# Patient Record
Sex: FEMALE | Race: WHITE | Employment: FULL TIME | URBAN - METROPOLITAN AREA
[De-identification: names, ages, dates, MRNs, and addresses within clinical notes are randomized per-mention and may not be internally consistent; named-entity substitution may affect disease eponyms.]

---

## 2018-02-19 ENCOUNTER — OFFICE VISIT (OUTPATIENT)
Dept: INTERNAL MEDICINE CLINIC | Age: 28
End: 2018-02-19

## 2018-02-19 VITALS
DIASTOLIC BLOOD PRESSURE: 90 MMHG | BODY MASS INDEX: 55.32 KG/M2 | SYSTOLIC BLOOD PRESSURE: 126 MMHG | WEIGHT: 293 LBS | HEIGHT: 61 IN | HEART RATE: 92 BPM | RESPIRATION RATE: 20 BRPM

## 2018-02-19 DIAGNOSIS — Z11.4 SCREENING FOR HIV (HUMAN IMMUNODEFICIENCY VIRUS): ICD-10-CM

## 2018-02-19 DIAGNOSIS — L70.0 ACNE VULGARIS: ICD-10-CM

## 2018-02-19 DIAGNOSIS — L68.0 HIRSUTISM: ICD-10-CM

## 2018-02-19 DIAGNOSIS — E66.01 MORBID OBESITY WITH BMI OF 60.0-69.9, ADULT (HCC): ICD-10-CM

## 2018-02-19 DIAGNOSIS — Z00.00 ANNUAL PHYSICAL EXAM: Primary | ICD-10-CM

## 2018-02-19 DIAGNOSIS — E88.81 DYSMETABOLIC SYNDROME: ICD-10-CM

## 2018-02-19 PROCEDURE — 99385 PREV VISIT NEW AGE 18-39: CPT | Performed by: INTERNAL MEDICINE

## 2018-02-19 PROCEDURE — 99203 OFFICE O/P NEW LOW 30 MIN: CPT | Performed by: INTERNAL MEDICINE

## 2018-02-19 NOTE — PROGRESS NOTES
[facility]  History and Physical  Yoav Narvaez M.D. King Harrington  YOB: 1990    Date of Service:  2/19/2018    Chief Complaint:   King Harrington is a 24 y.o. female who presents for   Chief Complaint   Patient presents with   Jerry Spears New Doctor       HPI: Here for Annual Physical and Follow up. She was told she may have POS at age 26 but never had work up for it. She has irregular periods, acne, facial hair and difficulty losing weight. She's wanting to start a family soon and would like to lose some weight. Wt Readings from Last 3 Encounters:   02/19/18 (!) 327 lb (148.3 kg)     BP Readings from Last 3 Encounters:   02/19/18 (!) 126/90       There is no problem list on file for this patient. No Known Allergies  No outpatient prescriptions have been marked as taking for the 2/19/18 encounter (Office Visit) with Jessi Lima MD.       History reviewed. No pertinent past medical history. History reviewed. No pertinent surgical history.   Family History   Problem Relation Age of Onset    Asthma Mother     Diabetes Mother     High Blood Pressure Mother     High Cholesterol Mother     Anxiety Disorder Father     Heart Attack Father 35    High Blood Pressure Father     High Cholesterol Father     Kidney Disease Father     Thyroid Disease Father     Asthma Sister     Asthma Brother     High Blood Pressure Maternal Aunt     Anxiety Disorder Maternal Aunt     Diabetes Paternal Aunt     High Blood Pressure Paternal Aunt     High Cholesterol Paternal Aunt     Stroke Paternal Aunt     Thyroid Disease Paternal Aunt     Diabetes Maternal Grandmother     Heart Attack Maternal Grandmother 61    High Blood Pressure Maternal Grandmother     High Cholesterol Maternal Grandmother     Kidney Disease Maternal Grandmother     Stroke Maternal Grandmother     Thyroid Disease Maternal Grandmother     Breast Cancer Maternal Grandmother     Heart Disease

## 2018-02-20 ENCOUNTER — TELEPHONE (OUTPATIENT)
Dept: INTERNAL MEDICINE CLINIC | Age: 28
End: 2018-02-20

## 2018-02-20 ENCOUNTER — HOSPITAL ENCOUNTER (OUTPATIENT)
Dept: GENERAL RADIOLOGY | Age: 28
Discharge: HOME | End: 2018-02-20
Attending: INTERNAL MEDICINE | Admitting: INTERNAL MEDICINE

## 2018-02-20 DIAGNOSIS — E66.01 MORBID OBESITY WITH BMI OF 60.0-69.9, ADULT (HCC): ICD-10-CM

## 2018-02-20 DIAGNOSIS — L70.0 ACNE VULGARIS: ICD-10-CM

## 2018-02-20 DIAGNOSIS — Z00.00 ANNUAL PHYSICAL EXAM: ICD-10-CM

## 2018-02-20 DIAGNOSIS — Z11.4 SCREENING FOR HIV (HUMAN IMMUNODEFICIENCY VIRUS): ICD-10-CM

## 2018-02-20 DIAGNOSIS — L68.0 HIRSUTISM: ICD-10-CM

## 2018-02-20 LAB
A/G RATIO: 1.3 (ref 1.1–2.2)
ALBUMIN SERPL-MCNC: 4.4 G/DL (ref 3.4–5)
ALP BLD-CCNC: 86 U/L (ref 40–129)
ALT SERPL-CCNC: 29 U/L (ref 10–40)
ANION GAP SERPL CALCULATED.3IONS-SCNC: 16 MMOL/L (ref 3–16)
AST SERPL-CCNC: 22 U/L (ref 15–37)
BASOPHILS ABSOLUTE: 0 K/UL (ref 0–0.2)
BASOPHILS RELATIVE PERCENT: 0.4 %
BILIRUB SERPL-MCNC: 0.4 MG/DL (ref 0–1)
BUN BLDV-MCNC: 14 MG/DL (ref 7–20)
CALCIUM SERPL-MCNC: 9.7 MG/DL (ref 8.3–10.6)
CHLORIDE BLD-SCNC: 100 MMOL/L (ref 99–110)
CHOLESTEROL, TOTAL: 161 MG/DL (ref 0–199)
CO2: 25 MMOL/L (ref 21–32)
CORTISOL - AM: 10.9 UG/DL (ref 4.3–22.4)
CREAT SERPL-MCNC: 0.7 MG/DL (ref 0.6–1.1)
EOSINOPHILS ABSOLUTE: 0.1 K/UL (ref 0–0.6)
EOSINOPHILS RELATIVE PERCENT: 1.1 %
FOLLICLE STIMULATING HORMONE: 6.4 MIU/ML
GFR AFRICAN AMERICAN: >60
GFR NON-AFRICAN AMERICAN: >60
GLOBULIN: 3.3 G/DL
GLUCOSE BLD-MCNC: 121 MG/DL (ref 70–99)
HCT VFR BLD CALC: 39 % (ref 36–48)
HDLC SERPL-MCNC: 58 MG/DL (ref 40–60)
HEMOGLOBIN: 12.8 G/DL (ref 12–16)
LDL CHOLESTEROL CALCULATED: 75 MG/DL
LUTEINIZING HORMONE: 10.2 MIU/ML
LYMPHOCYTES ABSOLUTE: 2.2 K/UL (ref 1–5.1)
LYMPHOCYTES RELATIVE PERCENT: 26.5 %
MCH RBC QN AUTO: 26.9 PG (ref 26–34)
MCHC RBC AUTO-ENTMCNC: 32.8 G/DL (ref 31–36)
MCV RBC AUTO: 81.9 FL (ref 80–100)
MONOCYTES ABSOLUTE: 0.5 K/UL (ref 0–1.3)
MONOCYTES RELATIVE PERCENT: 5.7 %
NEUTROPHILS ABSOLUTE: 5.5 K/UL (ref 1.7–7.7)
NEUTROPHILS RELATIVE PERCENT: 66.3 %
PDW BLD-RTO: 15 % (ref 12.4–15.4)
PLATELET # BLD: 362 K/UL (ref 135–450)
PMV BLD AUTO: 8.4 FL (ref 5–10.5)
POTASSIUM SERPL-SCNC: 4.4 MMOL/L (ref 3.5–5.1)
RBC # BLD: 4.76 M/UL (ref 4–5.2)
SODIUM BLD-SCNC: 141 MMOL/L (ref 136–145)
T4 FREE: 1.3 NG/DL (ref 0.9–1.8)
TOTAL PROTEIN: 7.7 G/DL (ref 6.4–8.2)
TRIGL SERPL-MCNC: 142 MG/DL (ref 0–150)
TSH SERPL DL<=0.05 MIU/L-ACNC: 7.67 UIU/ML (ref 0.27–4.2)
VLDLC SERPL CALC-MCNC: 28 MG/DL
WBC # BLD: 8.3 K/UL (ref 4–11)

## 2018-02-21 DIAGNOSIS — E03.9 BORDERLINE HYPOTHYROIDISM: ICD-10-CM

## 2018-02-21 DIAGNOSIS — E28.2 PCOS (POLYCYSTIC OVARIAN SYNDROME): Primary | ICD-10-CM

## 2018-02-21 DIAGNOSIS — Z00.00 ANNUAL PHYSICAL EXAM: ICD-10-CM

## 2018-02-21 DIAGNOSIS — R73.9 HYPERGLYCEMIA: Primary | ICD-10-CM

## 2018-02-21 LAB
ESTIMATED AVERAGE GLUCOSE: 131.2 MG/DL
HBA1C MFR BLD: 6.2 %
HIV AG/AB: NORMAL
HIV ANTIGEN: NORMAL
HIV-1 ANTIBODY: NORMAL
HIV-2 AB: NORMAL

## 2018-02-21 RX ORDER — LEVOTHYROXINE SODIUM 0.03 MG/1
25 TABLET ORAL DAILY
Qty: 30 TABLET | Refills: 0 | Status: SHIPPED | OUTPATIENT
Start: 2018-02-21 | End: 2018-03-13

## 2018-02-22 LAB
DHEAS (DHEA SULFATE): 304 UG/DL (ref 65–380)
TESTOSTERONE TOTAL: 65 NG/DL (ref 20–70)

## 2018-03-07 ENCOUNTER — OFFICE VISIT (OUTPATIENT)
Dept: OBGYN CLINIC | Age: 28
End: 2018-03-07

## 2018-03-07 VITALS
SYSTOLIC BLOOD PRESSURE: 102 MMHG | WEIGHT: 293 LBS | TEMPERATURE: 97 F | BODY MASS INDEX: 57.52 KG/M2 | HEIGHT: 60 IN | HEART RATE: 87 BPM | DIASTOLIC BLOOD PRESSURE: 70 MMHG

## 2018-03-07 DIAGNOSIS — E03.9 BORDERLINE HYPOTHYROIDISM: ICD-10-CM

## 2018-03-07 DIAGNOSIS — N92.6 IRREGULAR MENSES: ICD-10-CM

## 2018-03-07 DIAGNOSIS — Z01.419 WOMEN'S ANNUAL ROUTINE GYNECOLOGICAL EXAMINATION: Primary | ICD-10-CM

## 2018-03-07 DIAGNOSIS — E28.2 PCOS (POLYCYSTIC OVARIAN SYNDROME): ICD-10-CM

## 2018-03-07 DIAGNOSIS — N97.9 INFERTILITY, FEMALE: ICD-10-CM

## 2018-03-07 DIAGNOSIS — E88.81 INSULIN RESISTANCE: ICD-10-CM

## 2018-03-07 DIAGNOSIS — E66.01 MORBID OBESITY WITH BMI OF 60.0-69.9, ADULT (HCC): ICD-10-CM

## 2018-03-07 DIAGNOSIS — Z12.4 PAP SMEAR FOR CERVICAL CANCER SCREENING: ICD-10-CM

## 2018-03-07 DIAGNOSIS — E88.81 DYSMETABOLIC SYNDROME: ICD-10-CM

## 2018-03-07 PROCEDURE — 99385 PREV VISIT NEW AGE 18-39: CPT | Performed by: OBSTETRICS & GYNECOLOGY

## 2018-03-07 RX ORDER — PNV NO.95/FERROUS FUM/FOLIC AC 28MG-0.8MG
1 TABLET ORAL DAILY
Qty: 90 TABLET | Refills: 3 | Status: SHIPPED | OUTPATIENT
Start: 2018-03-07

## 2018-03-07 RX ORDER — CLINDAMYCIN HYDROCHLORIDE 150 MG/1
300 CAPSULE ORAL 2 TIMES DAILY
Qty: 14 CAPSULE | Refills: 0 | Status: SHIPPED | OUTPATIENT
Start: 2018-03-07 | End: 2018-03-14

## 2018-03-11 PROBLEM — N92.6 IRREGULAR MENSES: Status: ACTIVE | Noted: 2018-03-11

## 2018-03-11 PROBLEM — E28.2 PCOS (POLYCYSTIC OVARIAN SYNDROME): Status: ACTIVE | Noted: 2018-03-11

## 2018-03-11 PROBLEM — E88.81 INSULIN RESISTANCE: Status: ACTIVE | Noted: 2018-03-11

## 2018-03-11 PROBLEM — N97.9 INFERTILITY, FEMALE: Status: ACTIVE | Noted: 2018-03-11

## 2018-03-11 ASSESSMENT — ENCOUNTER SYMPTOMS
ABDOMINAL DISTENTION: 0
SHORTNESS OF BREATH: 0
NAUSEA: 0
DIARRHEA: 0
ABDOMINAL PAIN: 0
CONSTIPATION: 0
VOMITING: 0

## 2018-03-13 ENCOUNTER — OFFICE VISIT (OUTPATIENT)
Dept: INTERNAL MEDICINE CLINIC | Age: 28
End: 2018-03-13

## 2018-03-13 VITALS
RESPIRATION RATE: 18 BRPM | HEIGHT: 60 IN | BODY MASS INDEX: 57.52 KG/M2 | OXYGEN SATURATION: 93 % | WEIGHT: 293 LBS | DIASTOLIC BLOOD PRESSURE: 82 MMHG | SYSTOLIC BLOOD PRESSURE: 128 MMHG | HEART RATE: 104 BPM

## 2018-03-13 DIAGNOSIS — E03.9 BORDERLINE HYPOTHYROIDISM: ICD-10-CM

## 2018-03-13 DIAGNOSIS — R73.03 BORDERLINE DIABETES: Primary | ICD-10-CM

## 2018-03-13 DIAGNOSIS — E66.01 MORBID OBESITY WITH BMI OF 60.0-69.9, ADULT (HCC): ICD-10-CM

## 2018-03-13 PROCEDURE — 99214 OFFICE O/P EST MOD 30 MIN: CPT | Performed by: INTERNAL MEDICINE

## 2018-03-13 RX ORDER — LEVOTHYROXINE SODIUM 0.05 MG/1
50 TABLET ORAL DAILY
Qty: 30 TABLET | Refills: 1 | Status: SHIPPED | OUTPATIENT
Start: 2018-03-13 | End: 2018-08-08 | Stop reason: SDUPTHER

## 2018-03-13 NOTE — PROGRESS NOTES
King Harrington  YOB: 1990    Date of Service:  3/13/2018    Chief Complaint:      Chief Complaint   Patient presents with    Medication Check       HPI:  King Harrington is a 24 y.o. Borderline diabetes:  Stable on metformin 500 mg bid with some intermittent diarrhea. Hypothyroidism: Recent symptoms: fatigue, weight gain and hair loss. No problems with synthroid 25 mcg qd. She denies weight loss, cold intolerance, heat intolerance, constipation, diarrhea, edema, anxiety, tremor, palpitations and dysphagia. Patient is  taking her medication consistently on an empty stomach. ? PCOS w/u per Dr. Lloyd Molina. She has irregular periods, acne, facial hair and difficulty losing weight. She's wanting to start a family soon and would like to lose some weight. Exercising 4-5 times a week.     Lab Results   Component Value Date    LABA1C 6.2 02/20/2018     Lab Results   Component Value Date     02/20/2018    K 4.4 02/20/2018     02/20/2018    CO2 25 02/20/2018    BUN 14 02/20/2018    CREATININE 0.7 02/20/2018    GLUCOSE 121 (H) 02/20/2018    CALCIUM 9.7 02/20/2018     Lab Results   Component Value Date    CHOL 161 02/20/2018    TRIG 142 02/20/2018    HDL 58 02/20/2018    LDLCALC 75 02/20/2018     Lab Results   Component Value Date    ALT 29 02/20/2018    AST 22 02/20/2018     Lab Results   Component Value Date    TSH 7.67 (H) 02/20/2018    T4FREE 1.3 02/20/2018     Lab Results   Component Value Date    WBC 8.3 02/20/2018    HGB 12.8 02/20/2018    HCT 39.0 02/20/2018    MCV 81.9 02/20/2018     02/20/2018     No results found for: INR   No results found for: PSA   No results found for: [address]     Patient Active Problem List   Diagnosis    Morbid obesity with BMI of 60.0-69.9, adult ([address]    Dysmetabolic syndrome    Borderline hypothyroidism    PCOS (polycystic ovarian syndrome)    Irregular menses    Infertility, female    Insulin resistance       No Known Allergies  Outpatient

## 2018-05-10 ENCOUNTER — TELEPHONE (OUTPATIENT)
Dept: OBGYN CLINIC | Age: 28
End: 2018-05-10

## 2018-05-10 NOTE — TELEPHONE ENCOUNTER
Left patient message to recall office. Patient was scheduled for follow up appointment on 4/30/18 regarding left side pain/ PCOS. Patient cancelled due to loss of insurance. Calling to see if she would like to reschedule.

## 2018-08-08 ENCOUNTER — TELEPHONE (OUTPATIENT)
Dept: OBGYN CLINIC | Age: 28
End: 2018-08-08

## 2018-08-08 ENCOUNTER — OFFICE VISIT (OUTPATIENT)
Dept: FAMILY MEDICINE CLINIC | Age: 28
End: 2018-08-08

## 2018-08-08 VITALS
WEIGHT: 293 LBS | HEIGHT: 60 IN | DIASTOLIC BLOOD PRESSURE: 80 MMHG | HEART RATE: 64 BPM | BODY MASS INDEX: 57.52 KG/M2 | SYSTOLIC BLOOD PRESSURE: 148 MMHG | OXYGEN SATURATION: 98 %

## 2018-08-08 DIAGNOSIS — E66.01 MORBID OBESITY WITH BMI OF 60.0-69.9, ADULT (HCC): ICD-10-CM

## 2018-08-08 DIAGNOSIS — N92.6 IRREGULAR MENSES: ICD-10-CM

## 2018-08-08 DIAGNOSIS — R73.03 PREDIABETES: ICD-10-CM

## 2018-08-08 DIAGNOSIS — E03.4 HYPOTHYROIDISM DUE TO ACQUIRED ATROPHY OF THYROID: Primary | ICD-10-CM

## 2018-08-08 PROBLEM — E03.9 BORDERLINE HYPOTHYROIDISM: Status: RESOLVED | Noted: 2018-02-21 | Resolved: 2018-08-08

## 2018-08-08 PROCEDURE — 99214 OFFICE O/P EST MOD 30 MIN: CPT | Performed by: FAMILY MEDICINE

## 2018-08-08 RX ORDER — METFORMIN HYDROCHLORIDE 500 MG/1
500 TABLET, EXTENDED RELEASE ORAL
Qty: 60 TABLET | Refills: 3 | Status: SHIPPED | OUTPATIENT
Start: 2018-08-08 | End: 2018-08-10 | Stop reason: SDUPTHER

## 2018-08-08 RX ORDER — LEVOTHYROXINE SODIUM 0.05 MG/1
50 TABLET ORAL DAILY
Qty: 30 TABLET | Refills: 1 | Status: SHIPPED | OUTPATIENT
Start: 2018-08-08 | End: 2018-08-24 | Stop reason: SDUPTHER

## 2018-08-08 ASSESSMENT — PATIENT HEALTH QUESTIONNAIRE - PHQ9
SUM OF ALL RESPONSES TO PHQ QUESTIONS 1-9: 0
SUM OF ALL RESPONSES TO PHQ9 QUESTIONS 1 & 2: 0
1. LITTLE INTEREST OR PLEASURE IN DOING THINGS: 0
2. FEELING DOWN, DEPRESSED OR HOPELESS: 0
SUM OF ALL RESPONSES TO PHQ QUESTIONS 1-9: 0

## 2018-08-08 ASSESSMENT — ENCOUNTER SYMPTOMS
VOMITING: 0
SHORTNESS OF BREATH: 0
ABDOMINAL PAIN: 0
DIARRHEA: 0
EYE DISCHARGE: 0
NAUSEA: 0
SINUS PRESSURE: 0
EYE PAIN: 0
CONSTIPATION: 0
COUGH: 0
RHINORRHEA: 0
BLOOD IN STOOL: 0
EYE REDNESS: 0
CHEST TIGHTNESS: 0
COLOR CHANGE: 0
WHEEZING: 0

## 2018-08-08 NOTE — PATIENT INSTRUCTIONS

## 2018-08-08 NOTE — PROGRESS NOTES
Pressure Mother     High Cholesterol Mother     Anxiety Disorder Father     Heart Attack Father 35    High Blood Pressure Father     High Cholesterol Father     Kidney Disease Father     Thyroid Disease Father     Asthma Sister     Asthma Brother     High Blood Pressure Maternal Aunt     Anxiety Disorder Maternal Aunt     Diabetes Paternal Aunt     High Blood Pressure Paternal Aunt     High Cholesterol Paternal Aunt     Stroke Paternal Aunt     Thyroid Disease Paternal Aunt     Diabetes Maternal Grandmother     Heart Attack Maternal Grandmother 61    High Blood Pressure Maternal Grandmother     High Cholesterol Maternal Grandmother     Kidney Disease Maternal Grandmother     Stroke Maternal Grandmother     Thyroid Disease Maternal Grandmother     Breast Cancer Maternal Grandmother     Heart Disease Maternal Grandmother         2 MI    Other Maternal Grandmother         MS    Dementia Maternal Grandmother     Anxiety Disorder Paternal Grandmother     Depression Paternal Grandmother     Breast Cancer Paternal Grandmother     Early Death Paternal Grandfather 52        MI     Heart Attack Paternal Grandfather 52     Social History   Substance Use Topics    Smoking status: Never Smoker    Smokeless tobacco: Never Used    Alcohol use No     Lab Review   No visits with results within 2 Month(s) from this visit.    Latest known visit with results is:   Hospital Outpatient Visit on 02/20/2018   Component Date Value    TSH 02/20/2018 7.67*    T4 Free 02/20/2018 1.3     Cholesterol, Total 02/20/2018 161     Triglycerides 02/20/2018 142     HDL 02/20/2018 58     LDL Calculated 02/20/2018 75     VLDL Cholesterol Calcula* 02/20/2018 28     Sodium 02/20/2018 141     Potassium 02/20/2018 4.4     Chloride 02/20/2018 100     CO2 02/20/2018 25     Anion Gap 02/20/2018 16     Glucose 02/20/2018 121*    BUN 02/20/2018 14     CREATININE 02/20/2018 0.7     GFR Non- 02/20/2018 >60     GFR  02/20/2018 >60     Calcium 02/20/2018 9.7     Total Protein 02/20/2018 7.7     Alb 02/20/2018 4.4     Albumin/Globulin Ratio 02/20/2018 1.3     Total Bilirubin 02/20/2018 0.4     Alkaline Phosphatase 02/20/2018 86     ALT 02/20/2018 29     AST 02/20/2018 22     Globulin 02/20/2018 3.3     WBC 02/20/2018 8.3     RBC 02/20/2018 4.76     Hemoglobin 02/20/2018 12.8     Hematocrit 02/20/2018 39.0     MCV 02/20/2018 81.9     MCH 02/20/2018 26.9     MCHC 02/20/2018 32.8     RDW 02/20/2018 15.0     Platelets 50/63/0940 362     MPV 02/20/2018 8.4     Neutrophils % 02/20/2018 66.3     Lymphocytes % 02/20/2018 26.5     Monocytes % 02/20/2018 5.7     Eosinophils % 02/20/2018 1.1     Basophils % 02/20/2018 0.4     Neutrophils # 02/20/2018 5.5     Lymphocytes # 02/20/2018 2.2     Monocytes # 02/20/2018 0.5     Eosinophils # 02/20/2018 0.1     Basophils # 02/20/2018 0.0     LH 02/20/2018 10.2     FSH 02/20/2018 6.4     Testosterone 02/20/2018 65     Cortisol - AM 02/20/2018 10.9     DHEAS (DHEA Sulfate) 02/20/2018 304.0     HIV Ag/Ab 02/20/2018 Non-Reactive     HIV-1 Antibody 02/20/2018 Non-Reactive     HIV ANTIGEN 02/20/2018 Non-Reactive     HIV-2 Ab 02/20/2018 Non-Reactive     Hemoglobin A1C 02/20/2018 6.2     eAG 02/20/2018 131.2        Review of Systems   Constitutional: Negative for chills, fatigue, fever and unexpected weight change. HENT: Negative for ear discharge, ear pain, hearing loss, rhinorrhea, sinus pressure and tinnitus. Eyes: Negative for pain, discharge, redness and visual disturbance. Respiratory: Negative for cough, chest tightness, shortness of breath and wheezing. Cardiovascular: Negative for chest pain and palpitations. Gastrointestinal: Negative for abdominal pain, blood in stool, constipation, diarrhea, nausea and vomiting. Genitourinary: Negative for difficulty urinating, dysuria and hematuria.

## 2018-08-10 ENCOUNTER — TELEPHONE (OUTPATIENT)
Dept: PRIMARY CARE CLINIC | Age: 28
End: 2018-08-10

## 2018-08-10 RX ORDER — METFORMIN HYDROCHLORIDE 500 MG/1
500 TABLET, EXTENDED RELEASE ORAL
Qty: 30 TABLET | Refills: 3 | Status: SHIPPED | OUTPATIENT
Start: 2018-08-10

## 2018-08-10 RX ORDER — METFORMIN HYDROCHLORIDE 500 MG/1
500 TABLET, EXTENDED RELEASE ORAL
Qty: 30 TABLET | Refills: 3 | Status: CANCELLED | OUTPATIENT
Start: 2018-08-10

## 2018-08-10 NOTE — TELEPHONE ENCOUNTER
Patient's insurance is requiring she use [facility] and no longer [facility].   Please send a new script for metformin to [facility].

## 2018-08-10 NOTE — TELEPHONE ENCOUNTER
[facility] MetFORMIN HCl ER 500MG OR TB24. Key: [id number]. Message came back that the drug is too soon to refill. Please advise patient.

## 2018-08-14 ENCOUNTER — TELEPHONE (OUTPATIENT)
Dept: FAMILY MEDICINE CLINIC | Age: 28
End: 2018-08-14

## 2018-08-14 NOTE — TELEPHONE ENCOUNTER
Patient returned office call, discussed with patient MD response. PCOS is diagnosed by a multitude of tests, there is no one diagnostic measure that gives this results. Insulin levels, hormone levels, abnormal bleeding. Patient already taking metformin. Other options for treatment would be to add an OCP. Patient also understand that we support her seeing weight management, this is the best treatment for PCOS. Patient states that she reached out because PCP had noted that there was no clinical note or diagnosis is chart and for insurance to help cover weight loss management this would be needed. Patient explained to pcp that when she was seen by GYN, it was for pap. Patient is asking if a visit for diagnosis is needed or any additional testing. Or can a clinical note be added. Routing to MD to review.

## 2018-08-25 RX ORDER — LEVOTHYROXINE SODIUM 0.05 MG/1
50 TABLET ORAL DAILY
Qty: 30 TABLET | Refills: 3 | Status: SHIPPED | OUTPATIENT
Start: 2018-08-25 | End: 2018-09-11 | Stop reason: SDUPTHER

## 2018-09-11 NOTE — TELEPHONE ENCOUNTER
[facility] pharmacy called stating that they received a prescription for a 30 day supply of the Levothyroxine but the insurance will not cover the cost of the medication unless it is a three month supply. Please advise.

## 2018-09-12 RX ORDER — LEVOTHYROXINE SODIUM 0.05 MG/1
50 TABLET ORAL DAILY
Qty: 90 TABLET | Refills: 0 | Status: SHIPPED | OUTPATIENT
Start: 2018-09-12

## 2018-10-22 ENCOUNTER — APPOINTMENT (OUTPATIENT)
Dept: GENERAL RADIOLOGY | Age: 28
End: 2018-10-22

## 2018-10-22 ENCOUNTER — HOSPITAL ENCOUNTER (EMERGENCY)
Age: 28
Discharge: HOME | End: 2018-10-22

## 2018-10-22 VITALS
TEMPERATURE: 98.1 F | BODY MASS INDEX: 63.47 KG/M2 | SYSTOLIC BLOOD PRESSURE: 148 MMHG | WEIGHT: 293 LBS | DIASTOLIC BLOOD PRESSURE: 94 MMHG | RESPIRATION RATE: 16 BRPM | OXYGEN SATURATION: 97 % | HEART RATE: 81 BPM

## 2018-10-22 DIAGNOSIS — S89.92XA KNEE INJURIES, LEFT, INITIAL ENCOUNTER: Primary | ICD-10-CM

## 2018-10-22 PROCEDURE — 99283 EMERGENCY DEPT VISIT LOW MDM: CPT

## 2018-10-22 PROCEDURE — 73560 X-RAY EXAM OF KNEE 1 OR 2: CPT

## 2018-10-22 ASSESSMENT — PAIN SCALES - GENERAL
PAINLEVEL_OUTOF10: 6
PAINLEVEL_OUTOF10: 6

## 2018-10-23 NOTE — ED PROVIDER NOTES
comfortable at discharge. .  A discussion was had with Mrs. Delisa Julio regarding knee pain, ED findings and recommendations for follow-up. All questions were answered. Patient will follow up with  orthopedist within 1 week for further evaluation/treatment. Patient will return to ED for new/worsening symptoms. Patient was informed to continue Tylenol and/or Motrin as needed for pain. MDM  I estimate there is LOW risk for COMPARTMENT SYNDROME, DEEP VENOUS THROMBOSIS, SEPTIC ARTHRITIS, TENDON OR NEUROVASCULAR INJURY, thus I consider the discharge disposition reasonable. Dylan Morrison and I have discussed the diagnosis and risks, and we agree with discharging home to follow-up with their primary doctor or the referral orthopedist. We also discussed returning to the Emergency Department immediately if new or worsening symptoms occur. We have discussed the symptoms which are most concerning (e.g., changing or worsening pain, numbness, weakness) that necessitate immediate return. Final Impression  1. Knee injuries, left, initial encounter      Blood pressure (!) 148/94, pulse 81, temperature 98.1 °F (36.7 °C), temperature source Oral, resp. rate 16, weight (!) 325 lb (147.4 kg), last menstrual period 10/19/2018, SpO2 97 %, not currently breastfeeding. DISPOSITION  Patient was discharged to home in good condition.           Shaila Lozano  10/22/18 [id number]